# Patient Record
Sex: MALE | Race: WHITE | NOT HISPANIC OR LATINO | Employment: FULL TIME | URBAN - NONMETROPOLITAN AREA
[De-identification: names, ages, dates, MRNs, and addresses within clinical notes are randomized per-mention and may not be internally consistent; named-entity substitution may affect disease eponyms.]

---

## 2021-04-25 ENCOUNTER — APPOINTMENT (EMERGENCY)
Dept: RADIOLOGY | Facility: HOSPITAL | Age: 36
End: 2021-04-25
Payer: COMMERCIAL

## 2021-04-25 ENCOUNTER — HOSPITAL ENCOUNTER (EMERGENCY)
Facility: HOSPITAL | Age: 36
Discharge: HOME | End: 2021-04-25
Attending: EMERGENCY MEDICINE
Payer: COMMERCIAL

## 2021-04-25 VITALS
BODY MASS INDEX: 30.72 KG/M2 | SYSTOLIC BLOOD PRESSURE: 166 MMHG | WEIGHT: 260.14 LBS | OXYGEN SATURATION: 96 % | RESPIRATION RATE: 22 BRPM | TEMPERATURE: 98.2 F | DIASTOLIC BLOOD PRESSURE: 90 MMHG | HEART RATE: 77 BPM | HEIGHT: 77 IN

## 2021-04-25 DIAGNOSIS — I10 HYPERTENSION: Primary | ICD-10-CM

## 2021-04-25 LAB
ALBUMIN SERPL BCP-MCNC: 4.6 G/DL (ref 3.5–5)
ALP SERPL-CCNC: 88 U/L (ref 46–116)
ALT SERPL W P-5'-P-CCNC: 64 U/L (ref 12–78)
ANION GAP SERPL CALCULATED.3IONS-SCNC: 10 MMOL/L (ref 4–13)
AST SERPL W P-5'-P-CCNC: 33 U/L (ref 5–45)
BASOPHILS # BLD AUTO: 0.08 THOUSANDS/ΜL (ref 0–0.1)
BASOPHILS NFR BLD AUTO: 1 % (ref 0–1)
BILIRUB SERPL-MCNC: 0.38 MG/DL (ref 0.2–1)
BUN SERPL-MCNC: 12 MG/DL (ref 5–25)
CALCIUM SERPL-MCNC: 9.3 MG/DL (ref 8.3–10.1)
CHLORIDE SERPL-SCNC: 98 MMOL/L (ref 100–108)
CO2 SERPL-SCNC: 29 MMOL/L (ref 21–32)
CREAT SERPL-MCNC: 0.98 MG/DL (ref 0.6–1.3)
EOSINOPHIL # BLD AUTO: 0.06 THOUSAND/ΜL (ref 0–0.61)
EOSINOPHIL NFR BLD AUTO: 1 % (ref 0–6)
ERYTHROCYTE [DISTWIDTH] IN BLOOD BY AUTOMATED COUNT: 12.7 % (ref 11.6–15.1)
GFR SERPL CREATININE-BSD FRML MDRD: 97 ML/MIN/1.73SQ M
GLUCOSE SERPL-MCNC: 106 MG/DL (ref 65–140)
HCT VFR BLD AUTO: 45.2 % (ref 36.5–49.3)
HGB BLD-MCNC: 15.1 G/DL (ref 12–17)
IMM GRANULOCYTES # BLD AUTO: 0.04 THOUSAND/UL (ref 0–0.2)
IMM GRANULOCYTES NFR BLD AUTO: 1 % (ref 0–2)
LYMPHOCYTES # BLD AUTO: 2.54 THOUSANDS/ΜL (ref 0.6–4.47)
LYMPHOCYTES NFR BLD AUTO: 34 % (ref 14–44)
MCH RBC QN AUTO: 31.4 PG (ref 26.8–34.3)
MCHC RBC AUTO-ENTMCNC: 33.4 G/DL (ref 31.4–37.4)
MCV RBC AUTO: 94 FL (ref 82–98)
MONOCYTES # BLD AUTO: 0.75 THOUSAND/ΜL (ref 0.17–1.22)
MONOCYTES NFR BLD AUTO: 10 % (ref 4–12)
NEUTROPHILS # BLD AUTO: 3.95 THOUSANDS/ΜL (ref 1.85–7.62)
NEUTS SEG NFR BLD AUTO: 53 % (ref 43–75)
NRBC BLD AUTO-RTO: 0 /100 WBCS
PLATELET # BLD AUTO: 273 THOUSANDS/UL (ref 149–390)
PMV BLD AUTO: 10 FL (ref 8.9–12.7)
POTASSIUM SERPL-SCNC: 3.4 MMOL/L (ref 3.5–5.3)
PROT SERPL-MCNC: 8.3 G/DL (ref 6.4–8.2)
RBC # BLD AUTO: 4.81 MILLION/UL (ref 3.88–5.62)
SODIUM SERPL-SCNC: 137 MMOL/L (ref 136–145)
TROPONIN I SERPL-MCNC: <0.02 NG/ML
TSH SERPL DL<=0.05 MIU/L-ACNC: 0.77 UIU/ML (ref 0.36–3.74)
WBC # BLD AUTO: 7.42 THOUSAND/UL (ref 4.31–10.16)

## 2021-04-25 PROCEDURE — 80053 COMPREHEN METABOLIC PANEL: CPT | Performed by: PHYSICIAN ASSISTANT

## 2021-04-25 PROCEDURE — 99285 EMERGENCY DEPT VISIT HI MDM: CPT | Performed by: PHYSICIAN ASSISTANT

## 2021-04-25 PROCEDURE — 85025 COMPLETE CBC W/AUTO DIFF WBC: CPT | Performed by: PHYSICIAN ASSISTANT

## 2021-04-25 PROCEDURE — 99285 EMERGENCY DEPT VISIT HI MDM: CPT

## 2021-04-25 PROCEDURE — 93005 ELECTROCARDIOGRAM TRACING: CPT

## 2021-04-25 PROCEDURE — 84484 ASSAY OF TROPONIN QUANT: CPT | Performed by: PHYSICIAN ASSISTANT

## 2021-04-25 PROCEDURE — 96374 THER/PROPH/DIAG INJ IV PUSH: CPT

## 2021-04-25 PROCEDURE — 84443 ASSAY THYROID STIM HORMONE: CPT | Performed by: PHYSICIAN ASSISTANT

## 2021-04-25 PROCEDURE — 36415 COLL VENOUS BLD VENIPUNCTURE: CPT | Performed by: PHYSICIAN ASSISTANT

## 2021-04-25 PROCEDURE — 71045 X-RAY EXAM CHEST 1 VIEW: CPT

## 2021-04-25 RX ORDER — AMLODIPINE BESYLATE 5 MG/1
5 TABLET ORAL DAILY
Qty: 14 TABLET | Refills: 0 | Status: SHIPPED | OUTPATIENT
Start: 2021-04-25

## 2021-04-25 RX ORDER — LORAZEPAM 2 MG/ML
1 INJECTION INTRAMUSCULAR ONCE
Status: COMPLETED | OUTPATIENT
Start: 2021-04-25 | End: 2021-04-25

## 2021-04-25 RX ADMIN — LORAZEPAM 1 MG: 2 INJECTION INTRAMUSCULAR; INTRAVENOUS at 18:58

## 2021-04-25 NOTE — ED PROVIDER NOTES
History  Chief Complaint   Patient presents with    Chest Pain     chest "discomfort" intermittent since 1:30 PM; no cardiac hx; patient states he has high blood pressure associated with it; denies nausea, vomiting, dizziness      Patient presents to the emergency department today for evaluation what he describes as an uneasy feeling in his chest   Patient states yesterday he felt well, woke up this morning felt well however at 1330 hours today he noticed this on the easy feeling  He denies actual chest pain or chest pressure  He denies nausea or vomiting  He states when he felt this way he checks blood pressure at was noted to be elevated  He has no prior history of elevated blood pressure or myocardial infarction  Only medical history is gastroesophageal reflux disease which he takes Prilosec for  He believes he is feeling anxious and that may be attributed to his symptoms  No nausea vomiting abdominal pain  He actually is pain-free  Prior to Admission Medications   Prescriptions Last Dose Informant Patient Reported? Taking? Omeprazole (PRILOSEC PO)   Yes Yes   Sig: Take by mouth      Facility-Administered Medications: None       History reviewed  No pertinent past medical history  Past Surgical History:   Procedure Laterality Date    MENISCECTOMY Left        History reviewed  No pertinent family history  I have reviewed and agree with the history as documented  E-Cigarette/Vaping     E-Cigarette/Vaping Substances     Social History     Tobacco Use    Smoking status: Never Smoker    Smokeless tobacco: Never Used   Substance Use Topics    Alcohol use: Never     Frequency: Never    Drug use: Never       Review of Systems   Constitutional: Negative for chills and fever  HENT: Negative for ear pain and sore throat  Eyes: Negative for pain and visual disturbance  Respiratory: Negative for cough and shortness of breath  Cardiovascular: Negative for chest pain and palpitations  Un easy feeling in chest   Gastrointestinal: Negative for abdominal pain and vomiting  Genitourinary: Negative for dysuria and hematuria  Musculoskeletal: Negative for arthralgias and back pain  Skin: Negative for color change and rash  Neurological: Negative for seizures and syncope  All other systems reviewed and are negative  Physical Exam  Physical Exam  Vitals signs and nursing note reviewed  Constitutional:       General: He is not in acute distress  Appearance: He is well-developed and normal weight  He is not ill-appearing, toxic-appearing or diaphoretic  HENT:      Head: Normocephalic and atraumatic  Eyes:      Conjunctiva/sclera: Conjunctivae normal       Pupils: Pupils are equal, round, and reactive to light  Neck:      Musculoskeletal: Neck supple  Cardiovascular:      Rate and Rhythm: Normal rate and regular rhythm  No extrasystoles are present  Heart sounds: Heart sounds not distant  No murmur  No systolic murmur  No diastolic murmur  No friction rub  No gallop  No S3 sounds  Pulmonary:      Effort: Pulmonary effort is normal  No tachypnea, accessory muscle usage or respiratory distress  Breath sounds: Normal breath sounds  No stridor  No decreased breath sounds, wheezing, rhonchi or rales  Abdominal:      Palpations: Abdomen is soft  Tenderness: There is no abdominal tenderness  Musculoskeletal: Normal range of motion  Right lower leg: He exhibits no tenderness  No edema  Left lower leg: He exhibits no tenderness  No edema  Skin:     General: Skin is warm and dry  Capillary Refill: Capillary refill takes less than 2 seconds  Neurological:      General: No focal deficit present  Mental Status: He is alert and oriented to person, place, and time  Psychiatric:         Mood and Affect: Mood is anxious  Behavior: Behavior is not agitated           Vital Signs  ED Triage Vitals [04/25/21 1846]   Temperature Pulse Respirations Blood Pressure SpO2   98 2 °F (36 8 °C) 88 20 (!) 201/98 99 %      Temp Source Heart Rate Source Patient Position - Orthostatic VS BP Location FiO2 (%)   Temporal Monitor Sitting Left arm --      Pain Score       5           Vitals:    04/25/21 1846 04/25/21 1930   BP: (!) 201/98 (!) 177/94   Pulse: 88 82   Patient Position - Orthostatic VS: Sitting          Visual Acuity      ED Medications  Medications   LORazepam (ATIVAN) injection 1 mg (1 mg Intravenous Given 4/25/21 1858)       Diagnostic Studies  Results Reviewed     Procedure Component Value Units Date/Time    TSH [[id number]]  (Normal) Collected: 04/25/21 1900    Lab Status: Final result Specimen: Blood from Arm, Right Updated: 04/25/21 1932     TSH 3RD GENERATON 0 769 uIU/mL     Narrative:      Patients undergoing fluorescein dye angiography may retain small amounts of fluorescein in the body for 48-72 hours post procedure  Samples containing fluorescein can produce falsely depressed TSH values  If the patient had this procedure,a specimen should be resubmitted post fluorescein clearance        Troponin I [[id number]]  (Normal) Collected: 04/25/21 1900    Lab Status: Final result Specimen: Blood from Arm, Right Updated: 04/25/21 1927     Troponin I <0 02 ng/mL     Comprehensive metabolic panel [[id number]]  (Abnormal) Collected: 04/25/21 1900    Lab Status: Final result Specimen: Blood from Arm, Right Updated: 04/25/21 1925     Sodium 137 mmol/L      Potassium 3 4 mmol/L      Chloride 98 mmol/L      CO2 29 mmol/L      ANION GAP 10 mmol/L      BUN 12 mg/dL      Creatinine 0 98 mg/dL      Glucose 106 mg/dL      Calcium 9 3 mg/dL      AST 33 U/L      ALT 64 U/L      Alkaline Phosphatase 88 U/L      Total Protein 8 3 g/dL      Albumin 4 6 g/dL      Total Bilirubin 0 38 mg/dL      eGFR 97 ml/min/1 73sq m     Narrative:      [facility] guidelines for Chronic Kidney Disease (CKD):     Stage 1 with normal or high GFR (GFR > 90 mL/min/1 73 square meters)    Stage 2 Mild CKD (GFR = 60-89 mL/min/1 73 square meters)    Stage 3A Moderate CKD (GFR = 45-59 mL/min/1 73 square meters)    Stage 3B Moderate CKD (GFR = 30-44 mL/min/1 73 square meters)    Stage 4 Severe CKD (GFR = 15-29 mL/min/1 73 square meters)    Stage 5 End Stage CKD (GFR <15 mL/min/1 73 square meters)  Note: GFR calculation is accurate only with a steady state creatinine    CBC and differential [[id number]] Collected: 04/25/21 1900    Lab Status: Final result Specimen: Blood from Arm, Right Updated: 04/25/21 1908     WBC 7 42 Thousand/uL      RBC 4 81 Million/uL      Hemoglobin 15 1 g/dL      Hematocrit 45 2 %      MCV 94 fL      MCH 31 4 pg      MCHC 33 4 g/dL      RDW 12 7 %      MPV 10 0 fL      Platelets 337 Thousands/uL      nRBC 0 /100 WBCs      Neutrophils Relative 53 %      Immat GRANS % 1 %      Lymphocytes Relative 34 %      Monocytes Relative 10 %      Eosinophils Relative 1 %      Basophils Relative 1 %      Neutrophils Absolute 3 95 Thousands/µL      Immature Grans Absolute 0 04 Thousand/uL      Lymphocytes Absolute 2 54 Thousands/µL      Monocytes Absolute 0 75 Thousand/µL      Eosinophils Absolute 0 06 Thousand/µL      Basophils Absolute 0 08 Thousands/µL                  XR chest 1 view portable   ED Interpretation by Lay Cruz PA-C (04/25 1916)   NO evidence of acute cardiopulmonary process                 Procedures  ECG 12 Lead Documentation Only    Date/Time: 4/25/2021 6:58 PM  Performed by: Lay Cruz PA-C  Authorized by: Lay Cruz PA-C     Indications / Diagnosis:  Chest pain  ECG reviewed by me, the ED Provider: yes    Patient location:  ED  Previous ECG:     Comparison to cardiac monitor: Yes    Interpretation:     Interpretation: normal    Rate:     ECG rate:  79    ECG rate assessment: normal    Rhythm:     Rhythm: sinus rhythm    Ectopy:     Ectopy: none    QRS:     QRS axis:  Normal    QRS intervals:  Normal  Conduction: Conduction: normal    ST segments:     ST segments:  Normal  T waves:     T waves: normal               ED Course  ED Course as of Apr 25 2016   Sun Apr 25, 2021 1915 WBC: 7 42   1915 Hemoglobin: 15 1   1915 Platelet Count: [address] TSH 3RD GENERATON: 0 769   1942 Troponin I: <0 02   1942 Sodium: 137   1942 CO2: 29   1942 TOTAL BILIRUBIN: 0 38   1942 eGFR: 97   1942 TOTAL BILIRUBIN: 0 38   2014 After having an extensive conversation with the patient at bedside he states he actually has not checked his blood pressure in over a year and a half  He went to give blood last week he was told that is systolic blood pressure was around 175 mmHg  I have suspicion that this is likely a blood pressure issue that has been slowly creeping up on him  Recommend Norvasc for the next week check his blood pressures with an appropriate size blood pressure cuff at home, ill minute as much sodium in her diet as possible follow-up with primary care that you are already established with  HEART Risk Score      Most Recent Value   Heart Score Risk Calculator   History  0 Filed at: 04/25/2021 1858   ECG  0 Filed at: 04/25/2021 1858   Age  0 Filed at: 04/25/2021 1858   Risk Factors  0 Filed at: 04/25/2021 1858   Troponin  0 Filed at: 04/25/2021 1858   HEART Score  0 Filed at: 04/25/2021 1858                      SBIRT 22yo+      Most Recent Value   SBIRT (25 yo +)   In order to provide better care to our patients, we are screening all of our patients for alcohol and drug use  Would it be okay to ask you these screening questions? No Filed at: 04/25/2021 1906   Initial Alcohol Screen: US AUDIT-C    1  How often do you have a drink containing alcohol?  0 Filed at: 04/25/2021 1906   2  How many drinks containing alcohol do you have on a typical day you are drinking? 0 Filed at: 04/25/2021 1906   3a  Male UNDER 65: How often do you have five or more drinks on one occasion? 0 Filed at: 04/25/2021 1906   3b   FEMALE Any Age, or MALE 65+: How often do you have 4 or more drinks on one occassion? 0 Filed at: 04/25/2021 1906   Audit-C Score  0 Filed at: 04/25/2021 1906   TEO: How many times in the past year have you    Used an illegal drug or used a prescription medication for non-medical reasons? Never Filed at: 04/25/2021 1906                    MDM    Disposition  Final diagnoses:   Hypertension     Time reflects when diagnosis was documented in both MDM as applicable and the Disposition within this note     Time User Action Codes Description Comment    4/25/2021  8:15 PM Saleem Cortes Add [I10] Hypertension       ED Disposition     ED Disposition Condition Date/Time Comment    Discharge Stable Sun Apr 25, 2021  8:15 PM Alex Chan discharge to home/self care  Follow-up Information     Follow up With Specialties Details Why Jody Cabello MD Family Medicine Call in 1 day  [address] [address] [phone]            Patient's Medications   Discharge Prescriptions    AMLODIPINE (NORVASC) 5 MG TABLET    Take 1 tablet (5 mg total) by mouth daily       Start Date: 4/25/2021 End Date: --       Order Dose: 5 mg       Quantity: 14 tablet    Refills: 0     No discharge procedures on file      PDMP Review     None          ED Provider  Electronically Signed by           Alonso Livingston PA-C  04/25/21 2016

## 2021-04-30 LAB
ATRIAL RATE: 79 BPM
P AXIS: 38 DEGREES
PR INTERVAL: 158 MS
QRS AXIS: -6 DEGREES
QRSD INTERVAL: 112 MS
QT INTERVAL: 394 MS
QTC INTERVAL: 451 MS
T WAVE AXIS: 21 DEGREES
VENTRICULAR RATE: 79 BPM

## 2021-04-30 PROCEDURE — 93010 ELECTROCARDIOGRAM REPORT: CPT | Performed by: INTERNAL MEDICINE

## 2021-05-22 ENCOUNTER — HOSPITAL ENCOUNTER (EMERGENCY)
Facility: HOSPITAL | Age: 36
Discharge: HOME | End: 2021-05-22
Attending: EMERGENCY MEDICINE | Admitting: EMERGENCY MEDICINE
Payer: COMMERCIAL

## 2021-05-22 VITALS
HEART RATE: 66 BPM | BODY MASS INDEX: 30.72 KG/M2 | OXYGEN SATURATION: 98 % | DIASTOLIC BLOOD PRESSURE: 94 MMHG | WEIGHT: 260.14 LBS | SYSTOLIC BLOOD PRESSURE: 148 MMHG | RESPIRATION RATE: 18 BRPM | TEMPERATURE: 98.5 F | HEIGHT: 77 IN

## 2021-05-22 DIAGNOSIS — K62.5 RECTAL BLEEDING: Primary | ICD-10-CM

## 2021-05-22 DIAGNOSIS — R19.7 DIARRHEA: ICD-10-CM

## 2021-05-22 LAB
ALBUMIN SERPL BCP-MCNC: 4.6 G/DL (ref 3.5–5)
ALP SERPL-CCNC: 77 U/L (ref 46–116)
ALT SERPL W P-5'-P-CCNC: 56 U/L (ref 12–78)
ANION GAP SERPL CALCULATED.3IONS-SCNC: 8 MMOL/L (ref 4–13)
AST SERPL W P-5'-P-CCNC: 31 U/L (ref 5–45)
BASOPHILS # BLD AUTO: 0.06 THOUSANDS/ΜL (ref 0–0.1)
BASOPHILS NFR BLD AUTO: 1 % (ref 0–1)
BILIRUB SERPL-MCNC: 1.32 MG/DL (ref 0.2–1)
BUN SERPL-MCNC: 11 MG/DL (ref 5–25)
CALCIUM SERPL-MCNC: 9.1 MG/DL (ref 8.3–10.1)
CHLORIDE SERPL-SCNC: 101 MMOL/L (ref 100–108)
CO2 SERPL-SCNC: 28 MMOL/L (ref 21–32)
CREAT SERPL-MCNC: 1.01 MG/DL (ref 0.6–1.3)
EOSINOPHIL # BLD AUTO: 0.07 THOUSAND/ΜL (ref 0–0.61)
EOSINOPHIL NFR BLD AUTO: 1 % (ref 0–6)
ERYTHROCYTE [DISTWIDTH] IN BLOOD BY AUTOMATED COUNT: 12 % (ref 11.6–15.1)
GFR SERPL CREATININE-BSD FRML MDRD: 94 ML/MIN/1.73SQ M
GLUCOSE SERPL-MCNC: 96 MG/DL (ref 65–140)
HCT VFR BLD AUTO: 48.2 % (ref 36.5–49.3)
HGB BLD-MCNC: 16.2 G/DL (ref 12–17)
IMM GRANULOCYTES # BLD AUTO: 0.02 THOUSAND/UL (ref 0–0.2)
IMM GRANULOCYTES NFR BLD AUTO: 0 % (ref 0–2)
LYMPHOCYTES # BLD AUTO: 1.64 THOUSANDS/ΜL (ref 0.6–4.47)
LYMPHOCYTES NFR BLD AUTO: 19 % (ref 14–44)
MCH RBC QN AUTO: 31.3 PG (ref 26.8–34.3)
MCHC RBC AUTO-ENTMCNC: 33.6 G/DL (ref 31.4–37.4)
MCV RBC AUTO: 93 FL (ref 82–98)
MONOCYTES # BLD AUTO: 0.81 THOUSAND/ΜL (ref 0.17–1.22)
MONOCYTES NFR BLD AUTO: 10 % (ref 4–12)
NEUTROPHILS # BLD AUTO: 5.92 THOUSANDS/ΜL (ref 1.85–7.62)
NEUTS SEG NFR BLD AUTO: 69 % (ref 43–75)
NRBC BLD AUTO-RTO: 0 /100 WBCS
PLATELET # BLD AUTO: 241 THOUSANDS/UL (ref 149–390)
PMV BLD AUTO: 10 FL (ref 8.9–12.7)
POTASSIUM SERPL-SCNC: 3.7 MMOL/L (ref 3.5–5.3)
PROT SERPL-MCNC: 8.3 G/DL (ref 6.4–8.2)
RBC # BLD AUTO: 5.17 MILLION/UL (ref 3.88–5.62)
SODIUM SERPL-SCNC: 137 MMOL/L (ref 136–145)
WBC # BLD AUTO: 8.52 THOUSAND/UL (ref 4.31–10.16)

## 2021-05-22 PROCEDURE — 96360 HYDRATION IV INFUSION INIT: CPT

## 2021-05-22 PROCEDURE — 85025 COMPLETE CBC W/AUTO DIFF WBC: CPT | Performed by: EMERGENCY MEDICINE

## 2021-05-22 PROCEDURE — 99284 EMERGENCY DEPT VISIT MOD MDM: CPT

## 2021-05-22 PROCEDURE — 80053 COMPREHEN METABOLIC PANEL: CPT | Performed by: EMERGENCY MEDICINE

## 2021-05-22 PROCEDURE — 36415 COLL VENOUS BLD VENIPUNCTURE: CPT | Performed by: EMERGENCY MEDICINE

## 2021-05-22 PROCEDURE — 99284 EMERGENCY DEPT VISIT MOD MDM: CPT | Performed by: EMERGENCY MEDICINE

## 2021-05-22 RX ORDER — METOPROLOL TARTRATE 50 MG/1
50 TABLET, FILM COATED ORAL DAILY
COMMUNITY

## 2021-05-22 RX ADMIN — SODIUM CHLORIDE 1000 ML: 0.9 INJECTION, SOLUTION INTRAVENOUS at 15:54

## 2021-05-22 NOTE — ED PROVIDER NOTES
History  Chief Complaint   Patient presents with    Rectal Bleeding     patient reports that he has had bm with small amount of red blood and today has had mainly bright red blood  denies any abdominal pain, n, or vomiting  27-year-old male with past medical history of hypertension and anxiety who is presenting for evaluation of rectal bleeding  Patient reports that he had a normal bowel movement yesterday morning  He thought he saw a small amount of red material but he thought it might have been something that he ate  Over the course of yesterday, the patient had frequent bowel movements  Each time he defecated, he produced a small amount of stool with some blood on the outside of it  There was also some blood in the toilet bowl  Patient reports that his stool became loose yesterday evening  He continued to defecate small amounts of stool with blood  This continued overnight  The patient would have the urge to defecate frequently and would sometimes only pass gas and other times would pass small amounts of stool with bright red blood  Patient had several small bloody bowel movements today as well  This has never happened before  Patient denies any melena  He denies any fever, chills, URI symptoms, cough, chest pain, shortness of breath, nausea, vomiting, abdominal pain, or urinary symptoms  No history of abdominal surgeries  No known sick contacts  No recent history of unintentional weight loss, fevers, chills, or night sweats  No known history of inflammatory bowel disease  Patient does not take any antiplatelets or anticoagulants  Prior to Admission Medications   Prescriptions Last Dose Informant Patient Reported? Taking?    Omeprazole (PRILOSEC PO) 5/21/2021 at Unknown time  Yes Yes   Sig: Take by mouth   amLODIPine (NORVASC) 5 mg tablet Not Taking at Unknown time  No No   Sig: Take 1 tablet (5 mg total) by mouth daily   Patient not taking: Reported on 5/22/2021   metoprolol tartrate (LOPRESSOR) 50 mg tablet 5/22/2021 at Unknown time  Yes Yes   Sig: Take 50 mg by mouth daily      Facility-Administered Medications: None       History reviewed  No pertinent past medical history  Past Surgical History:   Procedure Laterality Date    MENISCECTOMY Left        History reviewed  No pertinent family history  I have reviewed and agree with the history as documented  E-Cigarette/Vaping     E-Cigarette/Vaping Substances     Social History     Tobacco Use    Smoking status: Never Smoker    Smokeless tobacco: Never Used   Substance Use Topics    Alcohol use: Never     Frequency: Never    Drug use: Never       Review of Systems   Constitutional: Negative for diaphoresis, fever and unexpected weight change  HENT: Negative for congestion, rhinorrhea and sore throat  Eyes: Negative for pain, discharge and visual disturbance  Respiratory: Negative for cough, shortness of breath and wheezing  Cardiovascular: Negative for chest pain, palpitations and leg swelling  Gastrointestinal: Positive for blood in stool and diarrhea  Negative for abdominal pain, constipation, nausea and vomiting  Genitourinary: Negative for dysuria, flank pain and hematuria  Musculoskeletal: Negative for arthralgias and myalgias  Skin: Negative for rash and wound  Allergic/Immunologic: Negative for environmental allergies and food allergies  Neurological: Negative for dizziness, seizures, weakness and numbness  Hematological: Negative for adenopathy  Psychiatric/Behavioral: Negative for confusion and hallucinations  Physical Exam  Physical Exam  Vitals signs and nursing note reviewed  Constitutional:       Appearance: He is well-developed  He is not diaphoretic  HENT:      Head: Normocephalic and atraumatic  Right Ear: External ear normal       Left Ear: External ear normal       Nose: Nose normal    Eyes:      Pupils: Pupils are equal, round, and reactive to light  Cardiovascular:      Rate and Rhythm: Normal rate and regular rhythm  Heart sounds: Normal heart sounds  No murmur  Pulmonary:      Effort: Pulmonary effort is normal  No respiratory distress  Breath sounds: Normal breath sounds  No wheezing or rales  Abdominal:      General: Bowel sounds are normal  There is no distension  Palpations: Abdomen is soft  Tenderness: There is no abdominal tenderness  There is no guarding  Genitourinary:     Rectum: Guaiac result positive  Comments: No hemorrhoids present on rectal examination  Minimal stool obtained  No melena or hematochezia  Stool guaiac positive  Musculoskeletal: Normal range of motion  General: No deformity  Skin:     General: Skin is warm and dry  Capillary Refill: Capillary refill takes less than 2 seconds  Neurological:      Mental Status: He is alert and oriented to person, place, and time  Comments: No gross motor deficits noted  Cranial nerves II-XII are intact  Speech is normal, without dysarthria or aphasia  Psychiatric:         Behavior: Behavior normal       Comments: Patient appears anxious  His speech is rapid and somewhat pressured           Vital Signs  ED Triage Vitals [05/22/21 1438]   Temperature Pulse Respirations Blood Pressure SpO2   98 5 °F (36 9 °C) 89 18 (!) 191/108 98 %      Temp Source Heart Rate Source Patient Position - Orthostatic VS BP Location FiO2 (%)   Temporal Monitor Lying Right arm --      Pain Score       --           Vitals:    05/22/21 1545 05/22/21 1600 05/22/21 1630 05/22/21 1654   BP:  153/100 148/94 148/94   Pulse: 74 68 66 66   Patient Position - Orthostatic VS:   Sitting Sitting         Visual Acuity      ED Medications  Medications   sodium chloride 0 9 % bolus 1,000 mL (0 mL Intravenous Stopped 5/22/21 1654)       Diagnostic Studies  Results Reviewed     Procedure Component Value Units Date/Time    Comprehensive metabolic panel [[id number]]  (Abnormal) Collected: 05/22/21 1512    Lab Status: Final result Specimen: Blood from Arm, Right Updated: 05/22/21 1541     Sodium 137 mmol/L      Potassium 3 7 mmol/L      Chloride 101 mmol/L      CO2 28 mmol/L      ANION GAP 8 mmol/L      BUN 11 mg/dL      Creatinine 1 01 mg/dL      Glucose 96 mg/dL      Calcium 9 1 mg/dL      AST 31 U/L      ALT 56 U/L      Alkaline Phosphatase 77 U/L      Total Protein 8 3 g/dL      Albumin 4 6 g/dL      Total Bilirubin 1 32 mg/dL      eGFR 94 ml/min/1 73sq m     Narrative:      [employer] guidelines for Chronic Kidney Disease (CKD):     Stage 1 with normal or high GFR (GFR > 90 mL/min/1 73 square meters)    Stage 2 Mild CKD (GFR = 60-89 mL/min/1 73 square meters)    Stage 3A Moderate CKD (GFR = 45-59 mL/min/1 73 square meters)    Stage 3B Moderate CKD (GFR = 30-44 mL/min/1 73 square meters)    Stage 4 Severe CKD (GFR = 15-29 mL/min/1 73 square meters)    Stage 5 End Stage CKD (GFR <15 mL/min/1 73 square meters)  Note: GFR calculation is accurate only with a steady state creatinine    CBC and differential [[id number]] Collected: 05/22/21 1512    Lab Status: Final result Specimen: Blood from Arm, Right Updated: 05/22/21 1524     WBC 8 52 Thousand/uL      RBC 5 17 Million/uL      Hemoglobin 16 2 g/dL      Hematocrit 48 2 %      MCV 93 fL      MCH 31 3 pg      MCHC 33 6 g/dL      RDW 12 0 %      MPV 10 0 fL      Platelets 182 Thousands/uL      nRBC 0 /100 WBCs      Neutrophils Relative 69 %      Immat GRANS % 0 %      Lymphocytes Relative 19 %      Monocytes Relative 10 %      Eosinophils Relative 1 %      Basophils Relative 1 %      Neutrophils Absolute 5 92 Thousands/µL      Immature Grans Absolute 0 02 Thousand/uL      Lymphocytes Absolute 1 64 Thousands/µL      Monocytes Absolute 0 81 Thousand/µL      Eosinophils Absolute 0 07 Thousand/µL      Basophils Absolute 0 06 Thousands/µL     Stool Enteric Bacterial Panel by PCR [[id number]]     Lab Status: No result Specimen: Stool                  No orders to display              Procedures  Procedures         ED Course  ED Course as of May 22 1828   Sat May 22, 2021   1525 Increased from prior, possibly due to dehydration  Hemoglobin: 16 2                             SBIRT 22yo+      Most Recent Value   SBIRT (25 yo +)   In order to provide better care to our patients, we are screening all of our patients for alcohol and drug use  Would it be okay to ask you these screening questions? No Filed at: 05/22/2021 1442                    MDM  Number of Diagnoses or Management Options  Diarrhea: new and requires workup  Rectal bleeding: new and requires workup  Diagnosis management comments:     Differential diagnosis included but was not limited to infectious colitis, internal hemorrhoids, diverticulosis, less likely inflammatory bowel disease, less likely colorectal neoplasm  The patient had a benign abdominal examination and this was his first episode of rectal bleeding  He denied any history of fever, chills, or unintentional weight loss  No known sick contacts  Overall, based on history and physical examination, I suspected infectious colitis as the etiology for the patient's symptoms  CBC demonstrated stable hemoglobin  The patient was given IV fluids for possible mild dehydration  CMP was unremarkable  I did order a stool enteric bacteria PCR while the patient was in the ER but he was unable to provide a stool specimen  The patient was given a prescription for an outpatient stool study  Due to likely bacterial colitis, no antibiotics were indicated  He was told to follow-up with his PCP in 1-2 weeks for a recheck  Return precautions were provided  Patient verbalized understanding of all instructions         Amount and/or Complexity of Data Reviewed  Clinical lab tests: ordered and reviewed  Decide to obtain previous medical records or to obtain history from someone other than the patient: yes  Review and summarize past medical records: yes    Risk of Complications, Morbidity, and/or Mortality  Presenting problems: moderate  Diagnostic procedures: minimal  Management options: minimal    Patient Progress  Patient progress: improved      Disposition  Final diagnoses:   Rectal bleeding   Diarrhea     Time reflects when diagnosis was documented in both MDM as applicable and the Disposition within this note     Time User Action Codes Description Comment    5/22/2021  3:57 PM Garcia Wolff Add [K62 5] Rectal bleeding     5/22/2021  3:57 PM Garcia Wolff Add [R19 7] Diarrhea       ED Disposition     ED Disposition Condition Date/Time Comment    Discharge Good Sat May 22, 2021  3:57 PM Lila Scales III discharge to home/self care  Follow-up Information     Follow up With Specialties Details Why Contact Info Additional Information    Nate Leblanc MD Family Medicine Call in 2 days Please follow-up with your PCP within 1-2 weeks for a recheck  [facility] Wil[facility] Emergency Department Emergency Medicine Go to  If symptoms worsen  [facility] [address] [phone]  [facility] Emergency Department[address], [address]          Discharge Medication List as of 5/22/2021  4:07 PM      CONTINUE these medications which have NOT CHANGED    Details   metoprolol tartrate (LOPRESSOR) 50 mg tablet Take 50 mg by mouth daily, Historical Med      Omeprazole (PRILOSEC PO) Take by mouth, Historical Med      amLODIPine (NORVASC) 5 mg tablet Take 1 tablet (5 mg total) by mouth daily, Starting Sun 4/25/2021, Normal           Outpatient Discharge Orders   Stool Enteric Bacterial Panel by PCR   Standing Status: Future Standing Exp   Date: 05/22/22       PDMP Review     None          ED Provider  Electronically Signed by           Porfirio Shone, MD  05/22/21 9307

## 2021-05-22 NOTE — DISCHARGE INSTRUCTIONS
As we discussed, your symptoms are most consistent with a bacterial colitis  This could be caused by a number of different bacteria, including E  coli, Shigella, Salmonella, Campylobacter, etc   For the most part, antibiotic treatment is not required and the illness will run its course over a few more days  If you are able to, you can provide a stool sample so testing can be run to potentially determine which bacteria is causing your illness  Stay well-hydrated at home  Avoid greasy, fried, sugary, or heavy foods the next few days as could worsen your diarrhea  Return to the ER with fever, shaking chills, lightheadedness, dizziness, syncope, vomiting blood, repeated vomiting or diarrhea causing worsening dehydration, abdominal pain, or any other concerning symptoms  Otherwise, follow-up with your PCP  If you have persistent blood in your stool, you should call your PCP as this could be a sign of inflammatory bowel disease or colorectal cancer and should be investigated further  The blood should start to clear up once your bowel movements returned to normal   Even if the rectal bleeding resolves, I would still recommend that you see your PCP in 1-2 weeks for a recheck of these symptoms

## 2021-05-23 ENCOUNTER — APPOINTMENT (OUTPATIENT)
Dept: LAB | Facility: HOSPITAL | Age: 36
End: 2021-05-23
Attending: EMERGENCY MEDICINE
Payer: COMMERCIAL

## 2021-05-23 DIAGNOSIS — R19.7 DIARRHEA: ICD-10-CM

## 2021-05-23 DIAGNOSIS — K62.5 RECTAL BLEEDING: ICD-10-CM

## 2021-05-23 PROCEDURE — 87505 NFCT AGENT DETECTION GI: CPT

## 2021-05-24 LAB
CAMPYLOBACTER DNA SPEC NAA+PROBE: NORMAL
SALMONELLA DNA SPEC QL NAA+PROBE: NORMAL
SHIGA TOXIN STX GENE SPEC NAA+PROBE: NORMAL
SHIGELLA DNA SPEC QL NAA+PROBE: NORMAL

## 2021-05-25 ENCOUNTER — TELEPHONE (OUTPATIENT)
Dept: EMERGENCY DEPT | Facility: HOSPITAL | Age: 36
End: 2021-05-25

## 2021-05-25 NOTE — TELEPHONE ENCOUNTER
Pt called ED inquiring about stool study results  Submitted specimen to outpatient lab  Chart was reviewed  We reviewed negative findings on bacterial stool PCR  He questions what could have caused this episode  Pt denies any recurrence of symptoms  Recommended outpatient follow up with PCP to discuss further testing as indicated  Pt verbalized understanding and had no further questions

## 2025-05-10 ENCOUNTER — OFFICE VISIT (OUTPATIENT)
Dept: URGENT CARE | Facility: CLINIC | Age: 40
End: 2025-05-10
Payer: COMMERCIAL

## 2025-05-10 VITALS
RESPIRATION RATE: 18 BRPM | WEIGHT: 240 LBS | TEMPERATURE: 98.4 F | BODY MASS INDEX: 28.34 KG/M2 | SYSTOLIC BLOOD PRESSURE: 160 MMHG | HEART RATE: 86 BPM | DIASTOLIC BLOOD PRESSURE: 100 MMHG | HEIGHT: 77 IN | OXYGEN SATURATION: 99 %

## 2025-05-10 DIAGNOSIS — Z23 ENCOUNTER FOR IMMUNIZATION: ICD-10-CM

## 2025-05-10 DIAGNOSIS — S80.812A ABRASION OF LEFT LOWER LEG, INITIAL ENCOUNTER: Primary | ICD-10-CM

## 2025-05-10 DIAGNOSIS — I10 ELEVATED BLOOD PRESSURE READING IN OFFICE WITH DIAGNOSIS OF HYPERTENSION: ICD-10-CM

## 2025-05-10 PROCEDURE — 99203 OFFICE O/P NEW LOW 30 MIN: CPT

## 2025-05-10 PROCEDURE — 90715 TDAP VACCINE 7 YRS/> IM: CPT

## 2025-05-10 PROCEDURE — S9088 SERVICES PROVIDED IN URGENT: HCPCS

## 2025-05-10 RX ORDER — METOPROLOL SUCCINATE 100 MG/1
100 TABLET, EXTENDED RELEASE ORAL EVERY MORNING
COMMUNITY
Start: 2025-03-19

## 2025-05-10 NOTE — PATIENT INSTRUCTIONS
Monitor the wound for signs of infection including, but not limited to, increased redness, swelling, discharge or warmth or if any redness appears to be moving from the wound in a streak like fashion you will need to be rechecked ASAP.   Follow up with your primary care provider in about 2-3 days for wound recheck.  Keep the wound clean and dry. You may shower or bathe as usual. Wash the wound with soap and water. Pat dry. Apply topical antibiotic ointment and cover with dressing of choice.    Wound care discussed - keep area clean and dey. Shower as usual. Apply topical abx ointment once daily and cover with dressing of choice if still open. If not open, you may leave area open to air

## 2025-05-10 NOTE — PROGRESS NOTES
"  [facility]        NAME: Raymundo Merritt III is a 40 y.o. male  : 1985    MRN: [id number]  DATE: May 10, 2025  TIME: 2:01 PM    Assessment and Plan   Abrasion of left lower leg, initial encounter [S80.812A]  1. Abrasion of left lower leg, initial encounter  Tdap Vaccine greater than or equal to 6yo      2. Elevated blood pressure reading in office with diagnosis of hypertension        3. Encounter for immunization  Tdap Vaccine greater than or equal to 6yo        Pt sustained left lower leg (posterior) abrasion from a shanon nail when a wooden step broke and it scraped against the back of his leg. Incident happened just PTA. Last known tetanus  (immunization record indicates  also)  Instructed to monitor for signs of infection and present for recheck if this occurs.   Wound care discussed - keep area clean and dey. Shower as usual. Apply topical abx ointment once daily and cover with dressing of choice if still open. If not open, you may leave area open to air    Left leg wound cleaned with wound cleanser and 4x4s. Topical bacitracin applied. Non stick pads x2 (4x3\") applied to the wound and secured with coban. Pt tolerated well without discomfort.     HTN - pt with documented history of HTN. Pt reports he takes metoprolol daily and only took the dose for today about 15 minutes before presenting to this facility for treatment. Patient denies moderate to severe HA, dizziness, vision disturbances, chest pain, SOB, heart palpitations, decreased urine output, confusion, numbness or weakness. Pt was advised to seek treatment with ER if any symptoms develop.     Patient Instructions       Follow up with PCP in 3-5 days.  Proceed to  ER if symptoms worsen.    If tests are performed, our office will contact you with results only if changes need to made to the care plan discussed with you at the visit. [facility]    Chief Complaint     Chief Complaint " "  Patient presents with    Laceration     Left leg  Cut it with an old shanon nail         History of Present Illness       40-year-old male past medical history significant for allergic rhinitis, DJD, hypertension, GERD, nasal septal deviation and nasal turbinate hypertrophy presents to this clinic with complaint of a \"laceration\" to the back of the left leg.  Patient reports he was helping neighbors and stepped on a wooden step.  This step came off of the support and patient was subsequently scratched in the back of the left calf with a shanon nail.  Patient reports last tetanus 2009.    Laceration         Review of Systems   Review of Systems   Skin:  Positive for wound.         Current Medications       Current Outpatient Medications:     metoprolol succinate (TOPROL-XL) 100 mg 24 hr tablet, Take 100 mg by mouth every morning, Disp: , Rfl:     amLODIPine (NORVASC) 5 mg tablet, Take 1 tablet (5 mg total) by mouth daily (Patient not taking: Reported on 5/10/2025), Disp: 14 tablet, Rfl: 0    metoprolol tartrate (LOPRESSOR) 50 mg tablet, Take 50 mg by mouth daily (Patient not taking: Reported on 5/10/2025), Disp: , Rfl:     Omeprazole (PRILOSEC PO), Take by mouth (Patient not taking: Reported on 5/10/2025), Disp: , Rfl:     Current Allergies     Allergies as of 05/10/2025    (No Known Allergies)            The following portions of the patient's history were reviewed and updated as appropriate: allergies, current medications, past family history, past medical history, past social history, past surgical history and problem list.     Past Medical History:   Diagnosis Date    Anxiety        Past Surgical History:   Procedure Laterality Date    MENISCECTOMY Left        No family history on file.      Medications have been verified.        Objective   /100   Pulse 86   Temp 98.4 °F (36.9 °C)   Resp 18   Ht 6' 5\" (1.956 m)   Wt 109 kg (240 lb)   SpO2 99%   BMI 28.46 kg/m²        Physical Exam     Physical " Exam  Vitals and nursing note reviewed.   Constitutional:       General: He is not in acute distress.     Appearance: Normal appearance. He is obese. He is not ill-appearing.   HENT:      Head: Normocephalic and atraumatic.   Cardiovascular:      Rate and Rhythm: Normal rate and regular rhythm.      Pulses: Normal pulses.      Heart sounds: Normal heart sounds.   Pulmonary:      Effort: Pulmonary effort is normal.      Breath sounds: Normal breath sounds.   Musculoskeletal:         General: Normal range of motion.      Cervical back: Normal range of motion and neck supple.        Legs:       Comments: Superficial abrasion to posterior left lower leg; scant bleeding; no swelling or tenderness to area; cap refill less than 2 seconds; ROM and strength WNL   Skin:     General: Skin is warm.      Capillary Refill: Capillary refill takes less than 2 seconds.   Neurological:      General: No focal deficit present.      Mental Status: He is alert and oriented to person, place, and time.